# Patient Record
Sex: MALE
[De-identification: names, ages, dates, MRNs, and addresses within clinical notes are randomized per-mention and may not be internally consistent; named-entity substitution may affect disease eponyms.]

---

## 2022-11-04 PROBLEM — Z00.00 ENCOUNTER FOR PREVENTIVE HEALTH EXAMINATION: Status: ACTIVE | Noted: 2022-11-04

## 2022-11-07 ENCOUNTER — NON-APPOINTMENT (OUTPATIENT)
Age: 77
End: 2022-11-07

## 2022-11-07 ENCOUNTER — APPOINTMENT (OUTPATIENT)
Dept: CARDIOLOGY | Facility: CLINIC | Age: 77
End: 2022-11-07

## 2022-11-07 VITALS
OXYGEN SATURATION: 99 % | SYSTOLIC BLOOD PRESSURE: 194 MMHG | DIASTOLIC BLOOD PRESSURE: 97 MMHG | WEIGHT: 173 LBS | HEIGHT: 62 IN | BODY MASS INDEX: 31.83 KG/M2 | HEART RATE: 74 BPM

## 2022-11-07 DIAGNOSIS — Z82.49 FAMILY HISTORY OF ISCHEMIC HEART DISEASE AND OTHER DISEASES OF THE CIRCULATORY SYSTEM: ICD-10-CM

## 2022-11-07 DIAGNOSIS — E11.9 TYPE 2 DIABETES MELLITUS W/OUT COMPLICATIONS: ICD-10-CM

## 2022-11-07 DIAGNOSIS — L03.115 CELLULITIS OF RIGHT LOWER LIMB: ICD-10-CM

## 2022-11-07 DIAGNOSIS — E78.00 PURE HYPERCHOLESTEROLEMIA, UNSPECIFIED: ICD-10-CM

## 2022-11-07 DIAGNOSIS — Z83.42 FAMILY HISTORY OF FAMILIAL HYPERCHOLESTEROLEMIA: ICD-10-CM

## 2022-11-07 DIAGNOSIS — C61 MALIGNANT NEOPLASM OF PROSTATE: ICD-10-CM

## 2022-11-07 DIAGNOSIS — Z78.9 OTHER SPECIFIED HEALTH STATUS: ICD-10-CM

## 2022-11-07 PROCEDURE — 99204 OFFICE O/P NEW MOD 45 MIN: CPT

## 2022-11-07 PROCEDURE — 93000 ELECTROCARDIOGRAM COMPLETE: CPT

## 2022-11-07 RX ORDER — AMLODIPINE BESYLATE 10 MG/1
10 TABLET ORAL
Refills: 0 | Status: ACTIVE | COMMUNITY

## 2022-11-07 RX ORDER — ASPIRIN 81 MG/1
81 TABLET ORAL
Refills: 0 | Status: ACTIVE | COMMUNITY

## 2022-11-07 RX ORDER — SILDENAFIL 25 MG/1
25 TABLET ORAL
Refills: 0 | Status: ACTIVE | COMMUNITY

## 2022-11-07 RX ORDER — MELOXICAM 15 MG/1
15 TABLET ORAL
Refills: 0 | Status: ACTIVE | COMMUNITY

## 2022-11-07 RX ORDER — FUROSEMIDE 20 MG/1
20 TABLET ORAL
Qty: 26 | Refills: 0 | Status: ACTIVE | COMMUNITY
Start: 2022-10-27

## 2022-11-07 RX ORDER — METFORMIN HYDROCHLORIDE 500 MG/1
500 TABLET, COATED ORAL
Qty: 180 | Refills: 0 | Status: ACTIVE | COMMUNITY
Start: 2022-01-14

## 2022-11-07 RX ORDER — NEBIVOLOL 10 MG/1
10 TABLET ORAL
Qty: 90 | Refills: 0 | Status: ACTIVE | COMMUNITY
Start: 2022-09-29

## 2022-11-07 RX ORDER — LANCETS
EACH MISCELLANEOUS
Qty: 100 | Refills: 0 | Status: ACTIVE | COMMUNITY
Start: 2022-10-22

## 2022-11-07 RX ORDER — OLMESARTAN MEDOXOMIL AND HYDROCHLOROTHIAZIDE 40; 12.5 MG/1; MG/1
40-12.5 TABLET ORAL
Qty: 90 | Refills: 0 | Status: ACTIVE | COMMUNITY
Start: 2022-10-28

## 2022-11-07 RX ORDER — PRAVASTATIN SODIUM 20 MG/1
20 TABLET ORAL
Qty: 90 | Refills: 0 | Status: ACTIVE | COMMUNITY
Start: 2022-10-25

## 2022-11-07 RX ORDER — BLOOD SUGAR DIAGNOSTIC
STRIP MISCELLANEOUS
Qty: 100 | Refills: 0 | Status: ACTIVE | COMMUNITY
Start: 2022-10-16

## 2022-11-07 RX ORDER — TAMSULOSIN HYDROCHLORIDE 0.4 MG/1
0.4 CAPSULE ORAL
Qty: 90 | Refills: 0 | Status: ACTIVE | COMMUNITY
Start: 2022-10-22

## 2022-11-07 RX ORDER — POTASSIUM CHLORIDE 1500 MG/1
20 TABLET, FILM COATED, EXTENDED RELEASE ORAL
Qty: 90 | Refills: 0 | Status: ACTIVE | COMMUNITY
Start: 2022-09-30

## 2022-11-07 NOTE — DISCUSSION/SUMMARY
[FreeTextEntry1] : His blood pressure is elevated, and does not improve by the conclusion of the examination.  He is on multiple medications.  He does report atypical chest discomfort which is of uncertain etiology.  His history is quite difficult, and rambling.\par \par In terms of his hypertension, he will start checking his blood pressure twice daily at home, and come to see me again in about 2 weeks.  He will increase hydralazine to 50 mg 3 times daily in the meantime.\par \par In terms of his chest discomfort, he will require stress testing, noting multiple risk factors.  I need his blood pressure under better control before we do that.  He will schedule an echocardiogram.\par \par He will see me in 2 weeks.

## 2022-11-07 NOTE — HISTORY OF PRESENT ILLNESS
[FreeTextEntry1] : Summer presented to the office today for cardiovascular evaluation.  He presents for the further evaluation of multiple symptoms and problems.\par \par He has now 77 years old, with a history of essential hypertension, diabetes, and hypercholesterolemia.He has had recurrent RLE cellulitis, and has permanent right lower extremity edema.  He is not known to have any underlying structural heart disease.\par \par At baseline, he is sedentary.  For the last 3 months, he has noted increasing dyspnea with activity, with stair climbing, or with carrying anything.  He does not report chest discomfort with activity, suspicious for angina.  His history is quite disjointed, and he does report having a sensation of something hard in his abdomen, which would stretch from the right nipple area, down to his abdomen and toward his umbilicus.  His daughter reports that perhaps there was something hard there like a "vein ", which is no longer there.  Several weeks ago, he was admitted to the hospital with dizziness with standing, and a headache.  He was at [facility] for several days, where his medications were adjusted.  No information is available regarding that hospitalization.

## 2022-11-11 ENCOUNTER — APPOINTMENT (OUTPATIENT)
Dept: CARDIOLOGY | Facility: CLINIC | Age: 77
End: 2022-11-11

## 2022-11-11 PROCEDURE — 93306 TTE W/DOPPLER COMPLETE: CPT

## 2022-11-18 ENCOUNTER — APPOINTMENT (OUTPATIENT)
Dept: CARDIOLOGY | Facility: CLINIC | Age: 77
End: 2022-11-18

## 2022-11-18 ENCOUNTER — NON-APPOINTMENT (OUTPATIENT)
Age: 77
End: 2022-11-18

## 2022-11-18 VITALS
HEIGHT: 62 IN | BODY MASS INDEX: 32.2 KG/M2 | SYSTOLIC BLOOD PRESSURE: 183 MMHG | OXYGEN SATURATION: 94 % | DIASTOLIC BLOOD PRESSURE: 88 MMHG | WEIGHT: 175 LBS | HEART RATE: 83 BPM

## 2022-11-18 DIAGNOSIS — I10 ESSENTIAL (PRIMARY) HYPERTENSION: ICD-10-CM

## 2022-11-18 PROCEDURE — 93000 ELECTROCARDIOGRAM COMPLETE: CPT

## 2022-11-18 PROCEDURE — 99214 OFFICE O/P EST MOD 30 MIN: CPT

## 2022-11-18 RX ORDER — HYDRALAZINE HYDROCHLORIDE 25 MG/1
25 TABLET ORAL
Qty: 60 | Refills: 0 | Status: COMPLETED | COMMUNITY
Start: 2022-10-27

## 2022-11-18 RX ORDER — HYDRALAZINE HYDROCHLORIDE 100 MG/1
100 TABLET ORAL
Qty: 270 | Refills: 3 | Status: ACTIVE | COMMUNITY
Start: 2022-10-27 | End: 1900-01-01

## 2022-11-18 NOTE — DISCUSSION/SUMMARY
[FreeTextEntry1] : His blood pressure is elevated, and does not improve by the conclusion of the examination.  He is on multiple medications.  He does report atypical chest discomfort which is of uncertain etiology.  His history is quite difficult, and rambling.\par \par I will increase hydralazine further to 100 mg 3 times daily.  I have suggested a 24-hour ambulatory blood pressure monitor to evaluate for his overall blood pressure control.  It is not clear what is really happening for him.  His significant left-ventricular hypertrophy on his recent echo suggest that he does have significant uncontrolled hypertension.  If he proves that he is compliant, I might need to consider the use of clonidine as his next medication.  I would also consider the use of carvedilol or labetalol instead of Bystolic.\par \par He will come back to the office again in about 1 month.

## 2022-11-18 NOTE — HISTORY OF PRESENT ILLNESS
[FreeTextEntry1] : Summer presented to the office today for cardiovascular evaluation.  I saw him about 2 weeks ago.\par \par He is now 77 years old, with a history of essential hypertension, diabetes, and hypercholesterolemia.He has had recurrent RLE cellulitis, and has permanent right lower extremity edema.  He is not known to have any underlying structural heart disease.\par \par I saw him November 7, 2022 with increasing dyspnea with activity.  He reported some discomfort in his right side, from his chest into his abdomen.  He had a recent admission to the hospital with dizziness and severe hypertension.\par \par When I saw him in the office, I found him to be severely hypertensive.   I increased hydralazine, and asked him to return to the office today, 2 weeks later.  In the meantime, an echocardiogram was performed.  This revealed moderate concentric LVH.  Left ventricular systolic function was normal, with age-appropriate valvular abnormalities, mild mitral regurgitation, and diastolic impairment.\par \par He presents to the office today having been feeling well from a cardiovascular perspective.  He reports no chest discomfort or shortness of breath with activity.  He continues to experience an atypical right-sided abdominal discomfort, of uncertain etiology.  He denies orthopnea, PND and lower extremity edema.  He denies palpitations, dizziness and syncope.  He has been checking his blood pressure, and that has been labile, anywhere from the 140s up to the 170s.  He has been compliant with his medications.

## 2022-11-22 ENCOUNTER — APPOINTMENT (OUTPATIENT)
Dept: CARDIOLOGY | Facility: CLINIC | Age: 77
End: 2022-11-22

## 2022-11-22 PROCEDURE — 93790 AMBL BP MNTR W/SW I&R: CPT

## 2022-12-22 ENCOUNTER — NON-APPOINTMENT (OUTPATIENT)
Age: 77
End: 2022-12-22

## 2022-12-22 ENCOUNTER — APPOINTMENT (OUTPATIENT)
Dept: CARDIOLOGY | Facility: CLINIC | Age: 77
End: 2022-12-22

## 2022-12-22 VITALS
DIASTOLIC BLOOD PRESSURE: 83 MMHG | SYSTOLIC BLOOD PRESSURE: 164 MMHG | HEIGHT: 62 IN | BODY MASS INDEX: 32.2 KG/M2 | HEART RATE: 80 BPM | WEIGHT: 175 LBS | OXYGEN SATURATION: 97 %

## 2022-12-22 VITALS — DIASTOLIC BLOOD PRESSURE: 76 MMHG | SYSTOLIC BLOOD PRESSURE: 140 MMHG

## 2022-12-22 DIAGNOSIS — I10 ESSENTIAL (PRIMARY) HYPERTENSION: ICD-10-CM

## 2022-12-22 PROCEDURE — 99214 OFFICE O/P EST MOD 30 MIN: CPT

## 2022-12-22 PROCEDURE — 93000 ELECTROCARDIOGRAM COMPLETE: CPT

## 2022-12-22 NOTE — PHYSICAL EXAM
[Well Developed] : well developed [Well Nourished] : well nourished [No Acute Distress] : no acute distress [Normal Conjunctiva] : normal conjunctiva [Normal Venous Pressure] : normal venous pressure [No Carotid Bruit] : no carotid bruit [Normal S1, S2] : normal S1, S2 [No Murmur] : no murmur [No Rub] : no rub [No Gallop] : no gallop [Clear Lung Fields] : clear lung fields [Good Air Entry] : good air entry [No Respiratory Distress] : no respiratory distress  [Soft] : abdomen soft [Non Tender] : non-tender [No Masses/organomegaly] : no masses/organomegaly [Normal Bowel Sounds] : normal bowel sounds [Normal Gait] : normal gait [No Edema] : no edema [No Cyanosis] : no cyanosis [No Clubbing] : no clubbing [No Varicosities] : no varicosities [No Rash] : no rash [No Skin Lesions] : no skin lesions [Moves all extremities] : moves all extremities [No Focal Deficits] : no focal deficits [Normal Speech] : normal speech [Alert and Oriented] : alert and oriented [Normal memory] : normal memory [Rhythm Regular] : regular [Normal S1] : normal S1 [Normal S2] : normal S2 [___ +] : bilateral [unfilled]U+ pretibial pitting edema [No Abnormalities] : the abdominal aorta was not enlarged and no bruit was heard

## 2022-12-27 NOTE — DISCUSSION/SUMMARY
[FreeTextEntry1] : He is in no acute distress.  He is feeling well overall.  \par His blood pressure is elevated, and does not improve by the conclusion of the examination.  He is on multiple medications.  \par Ambulatory blood pressure monitoring demonstrates avg B/P 143/86 after the increase of hydralazine to 100mg TID.I think it best we continue his current regimen without change at this time. In addition he will continue amlodipine 10mg, nebivolol 10mg, olmesartan/HCTZ 40/12.5, lasix 20mg with potassium supplement. \par \par He does have significant left-ventricular hypertrophy on his recent echo suggest that he does have significant uncontrolled hypertension.  If he proves that he is compliant, I might need to consider the use of clonidine as his next medication.  I would also consider the use of carvedilol or labetalol instead of Bystolic.\par \par He will come back to the office again in about 2 month, sooner if questions or concerns arise.

## 2022-12-27 NOTE — CARDIOLOGY SUMMARY
[de-identified] : November 7, 2022 normal sinus rhythm\par November 18, 2022 normal sinus rhythm [de-identified] : 11/2022

## 2022-12-27 NOTE — ADDENDUM
[FreeTextEntry1] : bp elevated\par lifestyle changes for now\par may need clonidine if likely to be compliant

## 2022-12-27 NOTE — HISTORY OF PRESENT ILLNESS
[FreeTextEntry1] : Summer presented to the office today for cardiovascular evaluation.  I saw him 11/18/22\par \par He is now 77 years old, with a history of essential hypertension, diabetes, and hypercholesterolemia.He has had recurrent RLE cellulitis, and has permanent right lower extremity edema.  He is not known to have any underlying structural heart disease.\par \par I saw him November 7, 2022 with increasing dyspnea with activity.  He reported some discomfort in his right side, from his chest into his abdomen.  He had a recent admission to the hospital with dizziness and severe hypertension.\par \par When I saw him in the office, I found him to be severely hypertensive.   I increased hydralazine, and asked him to return to the office today, 2 weeks later.  In the meantime, an echocardiogram was performed.  This revealed moderate concentric LVH.  Left ventricular systolic function was normal, with age-appropriate valvular abnormalities, mild mitral regurgitation, and diastolic impairment.\par \par He presents to the office today having been feeling well from a cardiovascular perspective.  He reports no chest discomfort or shortness of breath with activity.  He continues to experience an atypical right-sided abdominal discomfort, of uncertain etiology.  \par \par He presents back for blood pressure check today.  \par He denies orthopnea, PND and lower extremity edema.  He denies palpitations, dizziness and syncope.  He has been checking his blood pressure, and that has been labile, anywhere from the 140s up to the 170s still at home. He had one reading one week ago with systolic up to 200. He states that was the only time. Denies any precipitation factors such as pain or missed medications.\par  He has been compliant with his medications.\par \par at last visit , hydralazine was increased to 100mg TID.

## 2023-03-23 NOTE — PHYSICAL EXAM
[Well Developed] : well developed [Well Nourished] : well nourished [No Acute Distress] : no acute distress [Normal Conjunctiva] : normal conjunctiva [Normal Venous Pressure] : normal venous pressure [No Carotid Bruit] : no carotid bruit [Normal S1, S2] : normal S1, S2 [No Murmur] : no murmur [No Rub] : no rub [No Gallop] : no gallop [Rhythm Regular] : regular [Normal S1] : normal S1 [Normal S2] : normal S2 [___ +] : bilateral [unfilled]U+ pretibial pitting edema [No Abnormalities] : the abdominal aorta was not enlarged and no bruit was heard [Clear Lung Fields] : clear lung fields [Good Air Entry] : good air entry [No Respiratory Distress] : no respiratory distress  [Soft] : abdomen soft [Non Tender] : non-tender [No Masses/organomegaly] : no masses/organomegaly [Normal Bowel Sounds] : normal bowel sounds [Normal Gait] : normal gait [No Edema] : no edema [No Cyanosis] : no cyanosis [No Clubbing] : no clubbing [No Varicosities] : no varicosities [No Rash] : no rash [No Skin Lesions] : no skin lesions [Moves all extremities] : moves all extremities [No Focal Deficits] : no focal deficits [Normal Speech] : normal speech [Alert and Oriented] : alert and oriented [Normal memory] : normal memory

## 2023-03-24 ENCOUNTER — APPOINTMENT (OUTPATIENT)
Dept: CARDIOLOGY | Facility: CLINIC | Age: 78
End: 2023-03-24

## 2023-03-24 NOTE — CARDIOLOGY SUMMARY
[de-identified] : November 7, 2022 normal sinus rhythm\par November 18, 2022 normal sinus rhythm\par March 24, 2023 normal sinus rhythm [de-identified] : 11/2022

## 2023-03-24 NOTE — DISCUSSION/SUMMARY
[FreeTextEntry1] : He is in no acute distress.  He is feeling well overall.  \par \par His blood pressure is elevated, and does not improve by the conclusion of the examination.  He is on multiple medications.  I think it best we continue his current regimen without change at this time. \par He does have significant left-ventricular hypertrophy on his recent echo suggest that he does have significant uncontrolled hypertension.  If he proves that he is compliant, I might need to consider the use of clonidine as his next medication.  I would also consider the use of carvedilol or labetalol instead of Bystolic.\par

## 2023-03-24 NOTE — HISTORY OF PRESENT ILLNESS
[FreeTextEntry1] : Summer presented to the office today for cardiovascular evaluation.  He was last seen in the office in December 2022.\par \par He is now 77 years old, with a history of essential hypertension, diabetes, and hypercholesterolemia.He has had recurrent RLE cellulitis, and has permanent right lower extremity edema.  He is not known to have any underlying structural heart disease.\par \par I saw him November 7, 2022 with increasing dyspnea with activity.  He reported some discomfort in his right side, from his chest into his abdomen.  He had a recent admission to the hospital with dizziness and severe hypertension.\par \par When I saw him in the office, I found him to be severely hypertensive.   I increased hydralazine, and asked him to return to the office today, 2 weeks later.  In the meantime, an echocardiogram was performed.  This revealed moderate concentric LVH.  Left ventricular systolic function was normal, with age-appropriate valvular abnormalities, mild mitral regurgitation, and diastolic impairment.\par \par He was again seen in the office in November 2022, at which time he was feeling well, with some ongoing symptoms.  Dralzine was increased at that time.  He was seen again in December 2022, with blood pressures that remained elevated.  I felt it was best to leave his medications alone at that time.  It was noted that he might need to start clonidine, but we were hoping to avoid it.\par \par He presents to the office today having been feeling well from a cardiovascular perspective.  He reports no chest discomfort or shortness of breath with activity.  He denies orthopnea, PND and lower extremity edema.  He denies palpitations, dizziness and syncope.  He has tried to remain physically active.  He reports that his blood pressure and cholesterol have been well controlled.